# Patient Record
Sex: MALE
[De-identification: names, ages, dates, MRNs, and addresses within clinical notes are randomized per-mention and may not be internally consistent; named-entity substitution may affect disease eponyms.]

---

## 2018-01-25 ENCOUNTER — HOSPITAL ENCOUNTER (OUTPATIENT)
Dept: HOSPITAL 9 - MADEKG | Age: 53
Discharge: HOME | End: 2018-01-25
Attending: FAMILY MEDICINE
Payer: COMMERCIAL

## 2018-01-25 DIAGNOSIS — R07.9: Primary | ICD-10-CM

## 2018-01-25 PROCEDURE — 93005 ELECTROCARDIOGRAM TRACING: CPT

## 2018-01-25 PROCEDURE — 93010 ELECTROCARDIOGRAM REPORT: CPT

## 2018-04-05 ENCOUNTER — HOSPITAL ENCOUNTER (OUTPATIENT)
Dept: HOSPITAL 9 - MADRAD | Age: 53
Discharge: HOME | End: 2018-04-05
Attending: FAMILY MEDICINE
Payer: COMMERCIAL

## 2018-04-05 DIAGNOSIS — M47.896: ICD-10-CM

## 2018-04-05 DIAGNOSIS — M54.5: Primary | ICD-10-CM

## 2018-04-05 PROCEDURE — 72100 X-RAY EXAM L-S SPINE 2/3 VWS: CPT

## 2018-04-05 NOTE — RAD
LUMBAR SPINE THREE VIEWS:

4/5/18

 

HISTORY: 

52-year-old male with history of slip and fall three weeks ago with low back pain. 

 

Extensive multilevel disc osteophytosis and facet arthrosis. No acute fracture or dislocation. No foc
al bone lesion. 

 

IMPRESSION:  

Severe spondylosis. 

 

POS: REJI

## 2020-01-13 ENCOUNTER — APPOINTMENT (RX ONLY)
Dept: URBAN - NONMETROPOLITAN AREA CLINIC 23 | Facility: CLINIC | Age: 55
Setting detail: DERMATOLOGY
End: 2020-01-13

## 2021-08-30 ENCOUNTER — HOSPITAL ENCOUNTER (OUTPATIENT)
Dept: HOSPITAL 92 - LABBT | Age: 56
Discharge: HOME | End: 2021-08-30
Attending: OTOLARYNGOLOGY
Payer: OTHER GOVERNMENT

## 2021-08-30 DIAGNOSIS — Z20.822: ICD-10-CM

## 2021-08-30 DIAGNOSIS — Z01.818: Primary | ICD-10-CM

## 2021-08-30 DIAGNOSIS — J34.2: ICD-10-CM

## 2021-08-30 DIAGNOSIS — J34.3: ICD-10-CM

## 2021-08-30 PROCEDURE — 93005 ELECTROCARDIOGRAM TRACING: CPT

## 2021-08-30 PROCEDURE — U0003 INFECTIOUS AGENT DETECTION BY NUCLEIC ACID (DNA OR RNA); SEVERE ACUTE RESPIRATORY SYNDROME CORONAVIRUS 2 (SARS-COV-2) (CORONAVIRUS DISEASE [COVID-19]), AMPLIFIED PROBE TECHNIQUE, MAKING USE OF HIGH THROUGHPUT TECHNOLOGIES AS DESCRIBED BY CMS-2020-01-R: HCPCS

## 2021-08-30 PROCEDURE — 93010 ELECTROCARDIOGRAM REPORT: CPT

## 2021-08-30 PROCEDURE — U0005 INFEC AGEN DETEC AMPLI PROBE: HCPCS

## 2024-08-01 ENCOUNTER — HOSPITAL ENCOUNTER (OUTPATIENT)
Dept: HOSPITAL 9 - MADRAD | Age: 59
Discharge: HOME | End: 2024-08-01
Attending: FAMILY MEDICINE
Payer: OTHER GOVERNMENT

## 2024-08-01 DIAGNOSIS — J34.2: Primary | ICD-10-CM

## 2024-08-01 PROCEDURE — 70220 X-RAY EXAM OF SINUSES: CPT
